# Patient Record
Sex: FEMALE | Race: WHITE | NOT HISPANIC OR LATINO | URBAN - METROPOLITAN AREA
[De-identification: names, ages, dates, MRNs, and addresses within clinical notes are randomized per-mention and may not be internally consistent; named-entity substitution may affect disease eponyms.]

---

## 2017-07-05 ENCOUNTER — EMERGENCY (EMERGENCY)
Facility: HOSPITAL | Age: 47
LOS: 1 days | Discharge: HOME | End: 2017-07-05
Admitting: EMERGENCY MEDICINE
Payer: COMMERCIAL

## 2017-07-05 PROCEDURE — 70450 CT HEAD/BRAIN W/O DYE: CPT | Mod: 26

## 2017-07-05 PROCEDURE — 99284 EMERGENCY DEPT VISIT MOD MDM: CPT | Mod: 25

## 2017-07-06 PROCEDURE — 96372 THER/PROPH/DIAG INJ SC/IM: CPT

## 2017-07-06 PROCEDURE — 70450 CT HEAD/BRAIN W/O DYE: CPT

## 2017-07-06 PROCEDURE — 99284 EMERGENCY DEPT VISIT MOD MDM: CPT | Mod: 25

## 2017-12-28 ENCOUNTER — OUTPATIENT (OUTPATIENT)
Dept: OUTPATIENT SERVICES | Facility: HOSPITAL | Age: 47
LOS: 1 days | End: 2017-12-28
Payer: COMMERCIAL

## 2017-12-28 VITALS
DIASTOLIC BLOOD PRESSURE: 74 MMHG | RESPIRATION RATE: 16 BRPM | WEIGHT: 242.07 LBS | HEART RATE: 95 BPM | SYSTOLIC BLOOD PRESSURE: 123 MMHG | TEMPERATURE: 99 F

## 2017-12-28 DIAGNOSIS — D25.9 LEIOMYOMA OF UTERUS, UNSPECIFIED: ICD-10-CM

## 2017-12-28 DIAGNOSIS — Z01.818 ENCOUNTER FOR OTHER PREPROCEDURAL EXAMINATION: ICD-10-CM

## 2017-12-28 DIAGNOSIS — Z98.890 OTHER SPECIFIED POSTPROCEDURAL STATES: Chronic | ICD-10-CM

## 2017-12-28 LAB
ALBUMIN SERPL ELPH-MCNC: 3.3 G/DL — SIGNIFICANT CHANGE UP (ref 3.3–5)
ALP SERPL-CCNC: 74 U/L — SIGNIFICANT CHANGE UP (ref 40–120)
ALT FLD-CCNC: 27 U/L — SIGNIFICANT CHANGE UP (ref 12–78)
ANION GAP SERPL CALC-SCNC: 10 MMOL/L — SIGNIFICANT CHANGE UP (ref 5–17)
AST SERPL-CCNC: 22 U/L — SIGNIFICANT CHANGE UP (ref 15–37)
BILIRUB SERPL-MCNC: 0.3 MG/DL — SIGNIFICANT CHANGE UP (ref 0.2–1.2)
BUN SERPL-MCNC: 8 MG/DL — SIGNIFICANT CHANGE UP (ref 7–23)
CALCIUM SERPL-MCNC: 8.3 MG/DL — LOW (ref 8.5–10.1)
CHLORIDE SERPL-SCNC: 104 MMOL/L — SIGNIFICANT CHANGE UP (ref 96–108)
CO2 SERPL-SCNC: 27 MMOL/L — SIGNIFICANT CHANGE UP (ref 22–31)
CREAT SERPL-MCNC: 0.75 MG/DL — SIGNIFICANT CHANGE UP (ref 0.5–1.3)
GLUCOSE SERPL-MCNC: 94 MG/DL — SIGNIFICANT CHANGE UP (ref 70–99)
HCG SERPL-ACNC: <1 MIU/ML — SIGNIFICANT CHANGE UP
HCT VFR BLD CALC: 31.5 % — LOW (ref 34.5–45)
HGB BLD-MCNC: 9.5 G/DL — LOW (ref 11.5–15.5)
MCHC RBC-ENTMCNC: 20.2 PG — LOW (ref 27–34)
MCHC RBC-ENTMCNC: 30.3 GM/DL — LOW (ref 32–36)
MCV RBC AUTO: 66.7 FL — LOW (ref 80–100)
PLATELET # BLD AUTO: 211 K/UL — SIGNIFICANT CHANGE UP (ref 150–400)
POTASSIUM SERPL-MCNC: 3.8 MMOL/L — SIGNIFICANT CHANGE UP (ref 3.5–5.3)
POTASSIUM SERPL-SCNC: 3.8 MMOL/L — SIGNIFICANT CHANGE UP (ref 3.5–5.3)
PROT SERPL-MCNC: 7.4 G/DL — SIGNIFICANT CHANGE UP (ref 6–8.3)
RBC # BLD: 4.73 M/UL — SIGNIFICANT CHANGE UP (ref 3.8–5.2)
RBC # FLD: 17.8 % — HIGH (ref 10.3–14.5)
SODIUM SERPL-SCNC: 141 MMOL/L — SIGNIFICANT CHANGE UP (ref 135–145)
WBC # BLD: 6.8 K/UL — SIGNIFICANT CHANGE UP (ref 3.8–10.5)
WBC # FLD AUTO: 6.8 K/UL — SIGNIFICANT CHANGE UP (ref 3.8–10.5)

## 2017-12-28 PROCEDURE — G0463: CPT

## 2017-12-28 PROCEDURE — 85027 COMPLETE CBC AUTOMATED: CPT

## 2017-12-28 PROCEDURE — 86900 BLOOD TYPING SEROLOGIC ABO: CPT

## 2017-12-28 PROCEDURE — 93010 ELECTROCARDIOGRAM REPORT: CPT | Mod: NC

## 2017-12-28 PROCEDURE — 86901 BLOOD TYPING SEROLOGIC RH(D): CPT

## 2017-12-28 PROCEDURE — 71020: CPT | Mod: 26

## 2017-12-28 PROCEDURE — 84702 CHORIONIC GONADOTROPIN TEST: CPT

## 2017-12-28 PROCEDURE — 71046 X-RAY EXAM CHEST 2 VIEWS: CPT

## 2017-12-28 PROCEDURE — 80053 COMPREHEN METABOLIC PANEL: CPT

## 2017-12-28 PROCEDURE — 93005 ELECTROCARDIOGRAM TRACING: CPT

## 2017-12-28 PROCEDURE — 86850 RBC ANTIBODY SCREEN: CPT

## 2017-12-28 NOTE — H&P PST ADULT - MUSCULOSKELETAL COMMENTS
Occasional lower back pain due to herniated discs x 4 in LS spine, Occasional left lower leg muscle cramps

## 2017-12-28 NOTE — H&P PST ADULT - PROBLEM SELECTOR PLAN 2
Medical clearance needed. CBC, Comprehensive panel, T&S, HCG, EKG and CXR ordered. Pre-op instructions and surgical scrubs given and pt verbalized understanding.

## 2017-12-28 NOTE — H&P PST ADULT - NEGATIVE RESPIRATORY AND THORAX SYMPTOMS
no cough/no dyspnea/no wheezing/no hemoptysis/no pleuritic chest pain no dyspnea/no pleuritic chest pain/no wheezing/no hemoptysis

## 2017-12-28 NOTE — H&P PST ADULT - GASTROINTESTINAL DETAILS
no bruit/no guarding/normal/no rigidity/no organomegaly/bowel sounds normal/no rebound tenderness/no masses palpable/nontender/no distention/soft

## 2017-12-28 NOTE — H&P PST ADULT - HISTORY OF PRESENT ILLNESS
46yo female with     Pt complaining of having multiple uterine fibroids for the last 5 years and pt reports to having menorrhagia. Pt states she had vaginal spotting for 2 months and spotting stopped 3-4 days ago. Pt denies current n/v/d and abdominal pain. Pt electing for abdominal hysterectomy with bilateral salpingectomy on 1/10/18. 46yo female with PMH of pulmonary sarcoidosis here for PST. Pt complaining of having multiple uterine fibroids for the last 5 years and pt reports to having menorrhagia. Pt states she had vaginal spotting for 2 months and spotting stopped 3-4 days ago. Pt denies current n/v/d and abdominal pain. Pt electing for abdominal hysterectomy with bilateral salpingectomy on 1/10/18.

## 2017-12-28 NOTE — H&P PST ADULT - NEGATIVE CARDIOVASCULAR SYMPTOMS
no paroxysmal nocturnal dyspnea/no claudication/no peripheral edema/no chest pain/no orthopnea/no palpitations/no dyspnea on exertion

## 2017-12-28 NOTE — H&P PST ADULT - NSANTHOSAYNRD_GEN_A_CORE
No. CATRACHITA screening performed.  STOP BANG Legend: 0-2 = LOW Risk; 3-4 = INTERMEDIATE Risk; 5-8 = HIGH Risk

## 2017-12-28 NOTE — H&P PST ADULT - RS GEN PE MLT RESP DETAILS PC
good air movement/airway patent/respirations non-labored/normal/breath sounds equal/clear to auscultation bilaterally

## 2017-12-28 NOTE — H&P PST ADULT - PMH
Anemia    Sarcoidosis of lung  (Dx 20 years ago, last seen by pulmonogist in 2011) Anemia    Leiomyoma of uterus, unspecified    Sarcoidosis of lung  (Dx 20 years ago, last seen by pulmonologist in 2011)

## 2017-12-28 NOTE — H&P PST ADULT - FAMILY HISTORY
Mother  Still living? No  Family history of breast cancer in mother, Age at diagnosis: Age Unknown     Father  Still living? No  CAD (coronary artery disease), Age at diagnosis: Age Unknown     Sibling  Still living? Yes, Estimated age: Age Unknown  Family history of leukemia, Age at diagnosis: Age Unknown

## 2018-01-09 RX ORDER — HYDROMORPHONE HYDROCHLORIDE 2 MG/ML
0.5 INJECTION INTRAMUSCULAR; INTRAVENOUS; SUBCUTANEOUS
Qty: 0 | Refills: 0 | Status: DISCONTINUED | OUTPATIENT
Start: 2018-01-10 | End: 2018-01-12

## 2018-01-09 RX ORDER — SODIUM CHLORIDE 9 MG/ML
1000 INJECTION, SOLUTION INTRAVENOUS
Qty: 0 | Refills: 0 | Status: DISCONTINUED | OUTPATIENT
Start: 2018-01-10 | End: 2018-01-10

## 2018-01-10 ENCOUNTER — INPATIENT (INPATIENT)
Facility: HOSPITAL | Age: 48
LOS: 1 days | Discharge: HOME | DRG: 743 | End: 2018-01-12
Attending: OBSTETRICS & GYNECOLOGY | Admitting: OBSTETRICS & GYNECOLOGY
Payer: COMMERCIAL

## 2018-01-10 ENCOUNTER — RESULT REVIEW (OUTPATIENT)
Age: 48
End: 2018-01-10

## 2018-01-10 VITALS
SYSTOLIC BLOOD PRESSURE: 133 MMHG | OXYGEN SATURATION: 99 % | TEMPERATURE: 98 F | WEIGHT: 242.07 LBS | HEART RATE: 77 BPM | RESPIRATION RATE: 22 BRPM | DIASTOLIC BLOOD PRESSURE: 78 MMHG

## 2018-01-10 DIAGNOSIS — Z98.890 OTHER SPECIFIED POSTPROCEDURAL STATES: Chronic | ICD-10-CM

## 2018-01-10 DIAGNOSIS — D25.9 LEIOMYOMA OF UTERUS, UNSPECIFIED: ICD-10-CM

## 2018-01-10 LAB — HCG UR QL: NEGATIVE — SIGNIFICANT CHANGE UP

## 2018-01-10 PROCEDURE — 88304 TISSUE EXAM BY PATHOLOGIST: CPT | Mod: 26

## 2018-01-10 PROCEDURE — 88307 TISSUE EXAM BY PATHOLOGIST: CPT | Mod: 26

## 2018-01-10 RX ORDER — SODIUM CHLORIDE 9 MG/ML
1000 INJECTION, SOLUTION INTRAVENOUS
Qty: 0 | Refills: 0 | Status: DISCONTINUED | OUTPATIENT
Start: 2018-01-10 | End: 2018-01-10

## 2018-01-10 RX ORDER — BUPIVACAINE HCL/PF 7.5 MG/ML
400 VIAL (ML) INJECTION
Qty: 0 | Refills: 0 | Status: DISCONTINUED | OUTPATIENT
Start: 2018-01-10 | End: 2018-01-12

## 2018-01-10 RX ORDER — ACETAMINOPHEN 500 MG
1000 TABLET ORAL ONCE
Qty: 0 | Refills: 0 | Status: COMPLETED | OUTPATIENT
Start: 2018-01-10 | End: 2018-01-10

## 2018-01-10 RX ORDER — INFLUENZA VIRUS VACCINE 15; 15; 15; 15 UG/.5ML; UG/.5ML; UG/.5ML; UG/.5ML
0.5 SUSPENSION INTRAMUSCULAR ONCE
Qty: 0 | Refills: 0 | Status: COMPLETED | OUTPATIENT
Start: 2018-01-10 | End: 2018-01-12

## 2018-01-10 RX ORDER — IBUPROFEN 200 MG
600 TABLET ORAL EVERY 8 HOURS
Qty: 0 | Refills: 0 | Status: DISCONTINUED | OUTPATIENT
Start: 2018-01-11 | End: 2018-01-12

## 2018-01-10 RX ORDER — KETOROLAC TROMETHAMINE 30 MG/ML
30 SYRINGE (ML) INJECTION EVERY 8 HOURS
Qty: 0 | Refills: 0 | Status: DISCONTINUED | OUTPATIENT
Start: 2018-01-10 | End: 2018-01-11

## 2018-01-10 RX ORDER — SODIUM CHLORIDE 9 MG/ML
1000 INJECTION, SOLUTION INTRAVENOUS
Qty: 0 | Refills: 0 | Status: DISCONTINUED | OUTPATIENT
Start: 2018-01-10 | End: 2018-01-11

## 2018-01-10 RX ORDER — DOCUSATE SODIUM 100 MG
100 CAPSULE ORAL THREE TIMES A DAY
Qty: 0 | Refills: 0 | Status: DISCONTINUED | OUTPATIENT
Start: 2018-01-10 | End: 2018-01-12

## 2018-01-10 RX ORDER — ONDANSETRON 8 MG/1
4 TABLET, FILM COATED ORAL ONCE
Qty: 0 | Refills: 0 | Status: DISCONTINUED | OUTPATIENT
Start: 2018-01-10 | End: 2018-01-12

## 2018-01-10 RX ORDER — BUPIVACAINE HCL/PF 7.5 MG/ML
400 VIAL (ML) INJECTION
Qty: 0 | Refills: 0 | Status: DISCONTINUED | OUTPATIENT
Start: 2018-01-10 | End: 2018-01-10

## 2018-01-10 RX ORDER — IBUPROFEN 200 MG
1 TABLET ORAL
Qty: 0 | Refills: 0 | COMMUNITY

## 2018-01-10 RX ORDER — ENOXAPARIN SODIUM 100 MG/ML
40 INJECTION SUBCUTANEOUS EVERY 24 HOURS
Qty: 0 | Refills: 0 | Status: DISCONTINUED | OUTPATIENT
Start: 2018-01-11 | End: 2018-01-11

## 2018-01-10 RX ORDER — MORPHINE SULFATE 50 MG/1
4 CAPSULE, EXTENDED RELEASE ORAL EVERY 4 HOURS
Qty: 0 | Refills: 0 | Status: DISCONTINUED | OUTPATIENT
Start: 2018-01-10 | End: 2018-01-11

## 2018-01-10 RX ORDER — OXYCODONE AND ACETAMINOPHEN 5; 325 MG/1; MG/1
2 TABLET ORAL EVERY 6 HOURS
Qty: 0 | Refills: 0 | Status: DISCONTINUED | OUTPATIENT
Start: 2018-01-10 | End: 2018-01-12

## 2018-01-10 RX ORDER — CEFAZOLIN SODIUM 1 G
2000 VIAL (EA) INJECTION ONCE
Qty: 0 | Refills: 0 | Status: COMPLETED | OUTPATIENT
Start: 2018-01-10 | End: 2018-01-10

## 2018-01-10 RX ORDER — FERROUS GLUCONATE 100 %
0 POWDER (GRAM) MISCELLANEOUS
Qty: 0 | Refills: 0 | COMMUNITY

## 2018-01-10 RX ADMIN — Medication 4 MILLILITER(S): at 12:21

## 2018-01-10 RX ADMIN — SODIUM CHLORIDE 125 MILLILITER(S): 9 INJECTION, SOLUTION INTRAVENOUS at 23:37

## 2018-01-10 RX ADMIN — SODIUM CHLORIDE 75 MILLILITER(S): 9 INJECTION, SOLUTION INTRAVENOUS at 08:47

## 2018-01-10 RX ADMIN — Medication 30 MILLIGRAM(S): at 22:10

## 2018-01-10 RX ADMIN — Medication 30 MILLIGRAM(S): at 16:23

## 2018-01-10 RX ADMIN — Medication 30 MILLIGRAM(S): at 15:33

## 2018-01-10 RX ADMIN — Medication 30 MILLIGRAM(S): at 21:51

## 2018-01-10 RX ADMIN — SODIUM CHLORIDE 125 MILLILITER(S): 9 INJECTION, SOLUTION INTRAVENOUS at 12:23

## 2018-01-10 NOTE — PATIENT PROFILE ADULT. - PMH
Anemia    Leiomyoma of uterus, unspecified    Sarcoidosis of lung  (Dx 20 years ago, last seen by pulmonologist in 2011)

## 2018-01-10 NOTE — BRIEF OPERATIVE NOTE - PROCEDURE
<<-----Click on this checkbox to enter Procedure Incision and drainage of endometrioma  01/10/2018  left ovary  Active  [id number]  Abdominal hysterotomy  01/10/2018    Active  [email or web address]  Salpingectomy, bilateral  01/10/2018    Active  [email or web address]

## 2018-01-10 NOTE — BRIEF OPERATIVE NOTE - POST-OP DX
Endometrioma of ovary  01/10/2018  left  Active  Fidelina Wilson  Intramural leiomyoma of uterus  01/10/2018    Fidelina Mahoney

## 2018-01-11 ENCOUNTER — TRANSCRIPTION ENCOUNTER (OUTPATIENT)
Age: 48
End: 2018-01-11

## 2018-01-11 LAB
ALBUMIN SERPL ELPH-MCNC: 2.6 G/DL — LOW (ref 3.3–5)
ALP SERPL-CCNC: 55 U/L — SIGNIFICANT CHANGE UP (ref 40–120)
ALT FLD-CCNC: 33 U/L — SIGNIFICANT CHANGE UP (ref 12–78)
ANION GAP SERPL CALC-SCNC: 7 MMOL/L — SIGNIFICANT CHANGE UP (ref 5–17)
AST SERPL-CCNC: 28 U/L — SIGNIFICANT CHANGE UP (ref 15–37)
BASOPHILS # BLD AUTO: 0 K/UL — SIGNIFICANT CHANGE UP (ref 0–0.2)
BASOPHILS NFR BLD AUTO: 0.4 % — SIGNIFICANT CHANGE UP (ref 0–2)
BILIRUB SERPL-MCNC: 0.3 MG/DL — SIGNIFICANT CHANGE UP (ref 0.2–1.2)
BUN SERPL-MCNC: 9 MG/DL — SIGNIFICANT CHANGE UP (ref 7–23)
CALCIUM SERPL-MCNC: 8 MG/DL — LOW (ref 8.5–10.1)
CHLORIDE SERPL-SCNC: 109 MMOL/L — HIGH (ref 96–108)
CO2 SERPL-SCNC: 27 MMOL/L — SIGNIFICANT CHANGE UP (ref 22–31)
CREAT SERPL-MCNC: 0.65 MG/DL — SIGNIFICANT CHANGE UP (ref 0.5–1.3)
EOSINOPHIL # BLD AUTO: 0 K/UL — SIGNIFICANT CHANGE UP (ref 0–0.5)
EOSINOPHIL NFR BLD AUTO: 0 % — SIGNIFICANT CHANGE UP (ref 0–6)
GLUCOSE SERPL-MCNC: 92 MG/DL — SIGNIFICANT CHANGE UP (ref 70–99)
HCT VFR BLD CALC: 26.4 % — LOW (ref 34.5–45)
HGB BLD-MCNC: 8.1 G/DL — LOW (ref 11.5–15.5)
HYPOCHROMIA BLD QL: SLIGHT — SIGNIFICANT CHANGE UP
LYMPHOCYTES # BLD AUTO: 1.9 K/UL — SIGNIFICANT CHANGE UP (ref 1–3.3)
LYMPHOCYTES # BLD AUTO: 17 % — SIGNIFICANT CHANGE UP (ref 13–44)
MCHC RBC-ENTMCNC: 20.5 PG — LOW (ref 27–34)
MCHC RBC-ENTMCNC: 30.6 GM/DL — LOW (ref 32–36)
MCV RBC AUTO: 67.1 FL — LOW (ref 80–100)
MONOCYTES # BLD AUTO: 1 K/UL — HIGH (ref 0–0.9)
MONOCYTES NFR BLD AUTO: 9.6 % — HIGH (ref 1–9)
NEUTROPHILS # BLD AUTO: 8 K/UL — HIGH (ref 1.8–7.4)
NEUTROPHILS NFR BLD AUTO: 73 % — SIGNIFICANT CHANGE UP (ref 43–77)
PLAT MORPH BLD: NORMAL — SIGNIFICANT CHANGE UP
PLATELET # BLD AUTO: 278 K/UL — SIGNIFICANT CHANGE UP (ref 150–400)
POTASSIUM SERPL-MCNC: 3.5 MMOL/L — SIGNIFICANT CHANGE UP (ref 3.5–5.3)
POTASSIUM SERPL-SCNC: 3.5 MMOL/L — SIGNIFICANT CHANGE UP (ref 3.5–5.3)
PROT SERPL-MCNC: 6.1 G/DL — SIGNIFICANT CHANGE UP (ref 6–8.3)
RBC # BLD: 3.94 M/UL — SIGNIFICANT CHANGE UP (ref 3.8–5.2)
RBC # FLD: 17.7 % — HIGH (ref 10.3–14.5)
RBC BLD AUTO: SIGNIFICANT CHANGE UP
SODIUM SERPL-SCNC: 143 MMOL/L — SIGNIFICANT CHANGE UP (ref 135–145)
SURGICAL PATHOLOGY FINAL REPORT - CH: SIGNIFICANT CHANGE UP
WBC # BLD: 10.9 K/UL — HIGH (ref 3.8–10.5)
WBC # FLD AUTO: 10.9 K/UL — HIGH (ref 3.8–10.5)

## 2018-01-11 RX ADMIN — SODIUM CHLORIDE 125 MILLILITER(S): 9 INJECTION, SOLUTION INTRAVENOUS at 06:37

## 2018-01-11 RX ADMIN — Medication 600 MILLIGRAM(S): at 07:06

## 2018-01-11 RX ADMIN — OXYCODONE AND ACETAMINOPHEN 2 TABLET(S): 5; 325 TABLET ORAL at 19:08

## 2018-01-11 RX ADMIN — Medication 600 MILLIGRAM(S): at 15:30

## 2018-01-11 RX ADMIN — Medication 600 MILLIGRAM(S): at 22:30

## 2018-01-11 RX ADMIN — Medication 600 MILLIGRAM(S): at 14:50

## 2018-01-11 RX ADMIN — Medication 600 MILLIGRAM(S): at 21:39

## 2018-01-11 RX ADMIN — OXYCODONE AND ACETAMINOPHEN 2 TABLET(S): 5; 325 TABLET ORAL at 10:15

## 2018-01-11 RX ADMIN — OXYCODONE AND ACETAMINOPHEN 2 TABLET(S): 5; 325 TABLET ORAL at 09:27

## 2018-01-11 RX ADMIN — Medication 600 MILLIGRAM(S): at 06:19

## 2018-01-11 RX ADMIN — OXYCODONE AND ACETAMINOPHEN 2 TABLET(S): 5; 325 TABLET ORAL at 18:15

## 2018-01-11 NOTE — PROGRESS NOTE ADULT - SUBJECTIVE AND OBJECTIVE BOX
POD #1 s/p subtotal hysterectomy, bilateral salpingectomy    S: Patient feels well. Pain well controlled. Sat in chair but hasnt really ambulated. Tolerating clears. No flatus yet but feels gurgling. Denies fevers, chills, nausea, vomiting, dizziness.    O:    Vital Signs Last 24 Hrs  T(C): 37.4 (11 Jan 2018 07:34), Max: 37.4 (11 Jan 2018 07:34)  T(F): 99.3 (11 Jan 2018 07:34), Max: 99.3 (11 Jan 2018 07:34)  HR: 71 (11 Jan 2018 07:34) (60 - 78)  BP: 121/78 (11 Jan 2018 07:34) (104/53 - 121/78)  BP(mean): --  RR: 16 (11 Jan 2018 07:34) (13 - 22)  SpO2: 96% (11 Jan 2018 07:34) (92% - 99%)  Gen: AAO x 3  Abd: Soft, mildly distended. Incision dressed, clean and dry. no induration, no discharge, no erythema.     Labs:                          8.1    10.9  )-----------( 278      ( 11 Jan 2018 06:50 )             26.4     01-11    143  |  109<H>  |  9   ----------------------------<  92  3.5   |  27  |  0.65    Ca    8.0<L>      11 Jan 2018 06:50    TPro  6.1  /  Alb  2.6<L>  /  TBili  0.3  /  DBili  x   /  AST  28  /  ALT  33  /  AlkPhos  55  01-11    I&O's Summary    10 Jose 2018 07:01  -  11 Jan 2018 07:00  --------------------------------------------------------  IN: 2430 mL / OUT: 1950 mL / NET: 480 mL        MEDICATIONS  (STANDING):  BUpivacaine 0.375% On-Q Pump 400 milliLiter(s) (4 mL/Hr) IntraDermal. <Continuous>  enoxaparin Injectable 40 milliGRAM(s) SubCutaneous every 24 hours  ibuprofen  Tablet 600 milliGRAM(s) Oral every 8 hours  influenza   Vaccine 0.5 milliLiter(s) IntraMuscular once  lactated ringers. 1000 milliLiter(s) (125 mL/Hr) IV Continuous <Continuous>    MEDICATIONS  (PRN):  docusate sodium 100 milliGRAM(s) Oral three times a day PRN Stool Softening  HYDROmorphone  Injectable 0.5 milliGRAM(s) IV Push every 10 minutes PRN Moderate Pain (4 - 6)  morphine  - Injectable 4 milliGRAM(s) IV Push every 4 hours PRN Severe Pain  ondansetron Injectable 4 milliGRAM(s) IV Push once PRN Postoperative Nausea and/or Vomiting  oxyCODONE    5 mG/acetaminophen 325 mG 2 Tablet(s) Oral every 6 hours PRN Severe Pain      A/P: 47y Female POD #1 s/p hysterectomy  Advance diet  Encouraged ambulation  Hold lovenox in light of significant anemia (preop hct 31%)  Findings/procedure explained  PO analgesia

## 2018-01-11 NOTE — PROGRESS NOTE ADULT - SUBJECTIVE AND OBJECTIVE BOX
The patient was interviewed and evaluated  47y Female    Vital Signs Last 24 Hrs  T(C): 37.4 (11 Jan 2018 07:34), Max: 37.4 (11 Jan 2018 07:34)  T(F): 99.3 (11 Jan 2018 07:34), Max: 99.3 (11 Jan 2018 07:34)  HR: 71 (11 Jan 2018 07:34) (60 - 78)  BP: 121/78 (11 Jan 2018 07:34) (104/53 - 133/78)  BP(mean): --  RR: 16 (11 Jan 2018 07:34) (13 - 22)  SpO2: 96% (11 Jan 2018 07:34) (92% - 99%)    Pt seen, doing well, no anesthesia complications or complaints noted or reported.   No Nausea    No additional recommendations.     Pain well controlled

## 2018-01-12 ENCOUNTER — TRANSCRIPTION ENCOUNTER (OUTPATIENT)
Age: 48
End: 2018-01-12

## 2018-01-12 VITALS
TEMPERATURE: 99 F | SYSTOLIC BLOOD PRESSURE: 119 MMHG | DIASTOLIC BLOOD PRESSURE: 71 MMHG | HEART RATE: 72 BPM | OXYGEN SATURATION: 96 % | RESPIRATION RATE: 16 BRPM

## 2018-01-12 PROCEDURE — 90686 IIV4 VACC NO PRSV 0.5 ML IM: CPT

## 2018-01-12 PROCEDURE — 88304 TISSUE EXAM BY PATHOLOGIST: CPT

## 2018-01-12 PROCEDURE — 80053 COMPREHEN METABOLIC PANEL: CPT

## 2018-01-12 PROCEDURE — 88307 TISSUE EXAM BY PATHOLOGIST: CPT

## 2018-01-12 PROCEDURE — 82962 GLUCOSE BLOOD TEST: CPT

## 2018-01-12 PROCEDURE — C1889: CPT

## 2018-01-12 PROCEDURE — 85027 COMPLETE CBC AUTOMATED: CPT

## 2018-01-12 PROCEDURE — 81025 URINE PREGNANCY TEST: CPT

## 2018-01-12 RX ORDER — BUPIVACAINE HCL/PF 7.5 MG/ML
0 VIAL (ML) INJECTION
Qty: 0 | Refills: 0 | COMMUNITY
Start: 2018-01-12

## 2018-01-12 RX ADMIN — INFLUENZA VIRUS VACCINE 0.5 MILLILITER(S): 15; 15; 15; 15 SUSPENSION INTRAMUSCULAR at 11:58

## 2018-01-12 RX ADMIN — OXYCODONE AND ACETAMINOPHEN 2 TABLET(S): 5; 325 TABLET ORAL at 08:10

## 2018-01-12 RX ADMIN — Medication 600 MILLIGRAM(S): at 05:39

## 2018-01-12 RX ADMIN — Medication 600 MILLIGRAM(S): at 07:09

## 2018-01-12 RX ADMIN — OXYCODONE AND ACETAMINOPHEN 2 TABLET(S): 5; 325 TABLET ORAL at 09:21

## 2018-01-12 NOTE — DISCHARGE NOTE ADULT - CARE PLAN
Principal Discharge DX:	Intramural leiomyoma of uterus  Goal:	s/p subtotal hysterectomy  Instructions for follow-up, activity and diet:	regular diet. ambulate as tolerated.  Secondary Diagnosis:	Anemia

## 2018-01-12 NOTE — DISCHARGE NOTE ADULT - MEDICATION SUMMARY - MEDICATIONS TO TAKE
I will START or STAY ON the medications listed below when I get home from the hospital:    oxyCODONE-acetaminophen 5 mg-325 mg oral tablet  -- 2 tab(s) by mouth every 6 hours, As needed, Severe Pain  -- Indication: For pain med    Advil 200 mg oral tablet  -- 1 tab(s) by mouth every 6 hours, As Needed  -- Indication: For pain med    Ferralet 320 mg oral tablet  -- orally once a day  -- Indication: For Home med    bupivacaine  -- Indication: For pain pump

## 2018-01-12 NOTE — DISCHARGE NOTE ADULT - NS AS ACTIVITY OBS
Do not make important decisions/Walking-Indoors allowed/No Heavy lifting/straining/Walking-Outdoors allowed/Stairs allowed/Do not drive or operate machinery/Showering allowed

## 2018-01-12 NOTE — DISCHARGE NOTE ADULT - HOSPITAL COURSE
48 yo female s/p subtotal hysterectomy bilateral salpingectomy. patient tolerated the procedure well with no intra/ post op complication tolerating full diet and ambulating independently. will discharge and f/u in [place] office on Tuesday.

## 2018-01-12 NOTE — PROGRESS NOTE ADULT - SUBJECTIVE AND OBJECTIVE BOX
POD #2. Passing flatus. Ambulating. Pain well controlled. Tolerating reg diet. Denies fevers, chills, nausea, vomiting, dizziness.    O:    Vital Signs Last 24 Hrs  T(C): 37.2 (12 Jan 2018 07:35), Max: 37.2 (12 Jan 2018 07:35)  T(F): 99 (12 Jan 2018 07:35), Max: 99 (12 Jan 2018 07:35)  HR: 72 (12 Jan 2018 07:35) (67 - 72)  BP: 119/71 (12 Jan 2018 07:35) (96/67 - 119/71)  BP(mean): --  RR: 16 (12 Jan 2018 07:35) (16 - 16)  SpO2: 96% (12 Jan 2018 07:35) (95% - 98%)  Gen: AAO x 3  Abd: Soft, mildly distended.   Dressing removed, incision clean and dry, OnQ intact. no induration, no discharge, no erythema.     Labs:                          8.1    10.9  )-----------( 278      ( 11 Jan 2018 06:50 )             26.4     01-11    143  |  109<H>  |  9   ----------------------------<  92  3.5   |  27  |  0.65    Ca    8.0<L>      11 Jan 2018 06:50    TPro  6.1  /  Alb  2.6<L>  /  TBili  0.3  /  DBili  x   /  AST  28  /  ALT  33  /  AlkPhos  55  01-11    I&O's Summary    11 Jan 2018 07:01  -  12 Jan 2018 07:00  --------------------------------------------------------  IN: 760 mL / OUT: 400 mL / NET: 360 mL        MEDICATIONS  (STANDING):  BUpivacaine 0.375% On-Q Pump 400 milliLiter(s) (4 mL/Hr) IntraDermal. <Continuous>  ibuprofen  Tablet 600 milliGRAM(s) Oral every 8 hours  influenza   Vaccine 0.5 milliLiter(s) IntraMuscular once    MEDICATIONS  (PRN):  docusate sodium 100 milliGRAM(s) Oral three times a day PRN Stool Softening  HYDROmorphone  Injectable 0.5 milliGRAM(s) IV Push every 10 minutes PRN Moderate Pain (4 - 6)  ondansetron Injectable 4 milliGRAM(s) IV Push once PRN Postoperative Nausea and/or Vomiting  oxyCODONE    5 mG/acetaminophen 325 mG 2 Tablet(s) Oral every 6 hours PRN Severe Pain      A/P: 47y Female POD #2 s/p hysterectomy  Doing well, home today  Warning signs and discharge instructions reviewed  RTO tuesday for pain pump   Ibuprofen/percocet (has Rx)

## 2018-01-12 NOTE — DISCHARGE NOTE ADULT - ADDITIONAL INSTRUCTIONS
No intercourse/douching/tampons for 6 weeks. Avoid strenuous activity, exercise, heavy lifting for two weeks. You may shower freely, but do not soak in the tub or swim. Eat according to your appetite. Please follow all written and verbal instructions, and call us if you are having any acute problems, including (but not limited to) excessive vaginal bleeding (soaking pads), fever >100 degrees, persistent nausea or vomiting,  No Heavy lifting/straining, Do not drive or operate machinery, Showering allowed, Walking-Indoors allowed, Walking-Outdoors allowed, Stairs allowed  please f/u in [place] office on Tuesday

## 2018-01-12 NOTE — DISCHARGE NOTE ADULT - CARE PROVIDER_API CALL
Donnie Dennis), Obstetrics and Gynecology  [address] [place] [address]  Phone: [phone]  Fax: [phone]

## 2018-01-12 NOTE — DISCHARGE NOTE ADULT - PATIENT PORTAL LINK FT
“You can access the Follow[facility] Patient Portal, offered by [facility], by registering with the following website: [email or web address]”

## 2018-03-05 ENCOUNTER — APPOINTMENT (OUTPATIENT)
Dept: MAMMOGRAPHY | Facility: HOSPITAL | Age: 48
End: 2018-03-05
Payer: COMMERCIAL

## 2018-03-05 ENCOUNTER — APPOINTMENT (OUTPATIENT)
Dept: ULTRASOUND IMAGING | Facility: HOSPITAL | Age: 48
End: 2018-03-05
Payer: COMMERCIAL

## 2018-03-05 ENCOUNTER — OUTPATIENT (OUTPATIENT)
Dept: OUTPATIENT SERVICES | Facility: HOSPITAL | Age: 48
LOS: 1 days | End: 2018-03-05
Payer: COMMERCIAL

## 2018-03-05 DIAGNOSIS — Z98.890 OTHER SPECIFIED POSTPROCEDURAL STATES: Chronic | ICD-10-CM

## 2018-03-05 DIAGNOSIS — Z12.31 ENCOUNTER FOR SCREENING MAMMOGRAM FOR MALIGNANT NEOPLASM OF BREAST: ICD-10-CM

## 2018-03-05 PROCEDURE — 76641 ULTRASOUND BREAST COMPLETE: CPT

## 2018-03-05 PROCEDURE — 77063 BREAST TOMOSYNTHESIS BI: CPT | Mod: 26

## 2018-03-05 PROCEDURE — 77067 SCR MAMMO BI INCL CAD: CPT | Mod: 26

## 2018-03-05 PROCEDURE — 76641 ULTRASOUND BREAST COMPLETE: CPT | Mod: 26

## 2018-03-05 PROCEDURE — 77067 SCR MAMMO BI INCL CAD: CPT

## 2018-03-05 PROCEDURE — 77063 BREAST TOMOSYNTHESIS BI: CPT

## 2018-07-16 PROBLEM — D86.0 SARCOIDOSIS OF LUNG: Chronic | Status: ACTIVE | Noted: 2017-12-28
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
## 2023-08-08 NOTE — ASU PREOP CHECKLIST - ORDERS/MEDICATION ADMINISTRATION RECORD ON CHART
2nd attempt to contact the patient to schedule an endoscopy procedure(s) EGD and colonoscopy. The patient did not answer the call and left a voice message requesting a call back.     done

## 2024-01-05 NOTE — H&P PST ADULT - SURGICAL SITE INCISION
Postpartum Discharge Instructions      DIET  Eat a well balanced diet focusing on foods high in fiber and protein.     Drink plenty of fluids especially water.    To avoid constipation you may take a mild stool softener as recommended by your doctor .    ACTIVITY  Gradually increase your activity. Resume exercise regimen only after advise by your doctor.    Avoid lifting anything heavier than your baby or a gallon of milk for 2 weeks.     Avoid driving until your doctor or midwife has given their approval.    Rise slowly from a lying to sitting and then a standing position.    Climb stairs one at a time. Use caution when carrying your baby up and down the stairs.    NO SEXUAL Activity for 4-6 weeks or until advised by your doctor; Nothing in vagina: intercourse, tampons, or douching.     Be prepared to discuss family planning at your follow-up OB visit.     You may feel tired or have a lack of energy. You may continue your prenatal vitamin to replenish nutrients post delivery. Nap when baby naps to catch up on sleep.     EMOTIONS  You may feed traylor, sad, teary, & overwhelmed. Contact your OB provider if you feel you may be showing signs of postpartum depression, or have thoughts of harming yourself or your infant.    If infant will not stop crying, contact another adult for help or place infant in their crib on their back and take a break. NEVER shake your infant.     BLEEDING  Vaginal bleeding will decrease in amount over the next few weeks.    You will notice that as your activity increases, your flow may increase. This is your body's way of telling you, you need take things easier and rest more often.  Call your OB/ER if you are saturating more than one maxi pad in an hour & resting does help.    BREAST CARE  Take medications as recommended by your doctor or midwife for pain    If you develop a warm, red, tender area on your breast or develop a fever contact your OB provider.     For breastfeeding moms:  If  bleeding has increased and you are saturating a pad in an hour.    Your abdomen is tender to touch.    You are passing blood clots bigger than the size of a lemon.    If you are experiencing extreme weakness or dizziness.     If you are having flu-like symptoms such as achy muscles or joints.    There is a foul smell or a green color to your vaginal bleeding.    If you have pain that cannot be relieved with Tylenol or Motrin.    You have persistent burning with urination or frequency.     Call if you have concerns about your well-being.    You are unable to sleep, eat, or are having thoughts of harming yourself or your baby. Call you OB provider if your depression score is greater than 10.    You have swelling, bleeding, drainage, foul odor, redness, or warmth in/around your incision or stitches.    You have a red, warm, tender area in you calf. Please contact your OB physician right way.         Please refer to your \"Guide for New Mothers \" Binder for  further information of caring for yourself & your baby.      Follow-up with your OB doctor as specified.    For Breastfeeding moms, you can contact our lactation specialists with  any problems or questions you may have.  Contact our Lactation Consultants at [phone]. Please feel free to leave a message and they will return your call.       no

## 2024-04-19 PROBLEM — D25.9 LEIOMYOMA OF UTERUS, UNSPECIFIED: Chronic | Status: ACTIVE | Noted: 2017-12-28

## 2024-04-19 PROBLEM — D64.9 ANEMIA, UNSPECIFIED: Chronic | Status: ACTIVE | Noted: 2017-12-28

## 2024-05-01 PROBLEM — R19.00 PELVIC MASS: Status: ACTIVE | Noted: 2024-05-01

## 2024-05-01 PROBLEM — R97.1 ELEVATED CA-125: Status: ACTIVE | Noted: 2024-05-01

## 2024-05-07 NOTE — PATIENT PROFILE ADULT. - SOURCE OF INFORMATION, PROFILE
Comment: Biopsy proven AK right superior shoulder 04/16/24 Render Risk Assessment In Note?: no Detail Level: Generalized patient

## 2024-05-08 ENCOUNTER — APPOINTMENT (OUTPATIENT)
Dept: GYNECOLOGIC ONCOLOGY | Facility: CLINIC | Age: 54
End: 2024-05-08
Payer: COMMERCIAL

## 2024-05-08 VITALS
HEART RATE: 76 BPM | DIASTOLIC BLOOD PRESSURE: 82 MMHG | WEIGHT: 248.38 LBS | BODY MASS INDEX: 39.92 KG/M2 | SYSTOLIC BLOOD PRESSURE: 120 MMHG | HEIGHT: 66 IN

## 2024-05-08 DIAGNOSIS — Z80.3 FAMILY HISTORY OF MALIGNANT NEOPLASM OF BREAST: ICD-10-CM

## 2024-05-08 DIAGNOSIS — R19.00 INTRA-ABDOMINAL AND PELVIC SWELLING, MASS AND LUMP, UNSPECIFIED SITE: ICD-10-CM

## 2024-05-08 DIAGNOSIS — Z80.1 FAMILY HISTORY OF MALIGNANT NEOPLASM OF TRACHEA, BRONCHUS AND LUNG: ICD-10-CM

## 2024-05-08 DIAGNOSIS — R97.1 ELEVATED CANCER ANTIGEN 125 [CA 125]: ICD-10-CM

## 2024-05-08 DIAGNOSIS — D86.0 SARCOIDOSIS OF LUNG: ICD-10-CM

## 2024-05-08 PROCEDURE — 99204 OFFICE O/P NEW MOD 45 MIN: CPT

## 2024-05-08 PROCEDURE — 99459 PELVIC EXAMINATION: CPT

## 2024-05-08 NOTE — PHYSICAL EXAM
[Chaperone Present] : A chaperone was present in the examining room during all aspects of the physical examination [Normal] : Recto-Vaginal Exam: Normal [Fully active, able to carry on all pre-disease performance without restriction] : Status 0 - Fully active, able to carry on all pre-disease performance without restriction [[id number]] : A chaperone was present during the pelvic exam. [FreeTextEntry2] : Dimple [Absent] : Uterus: Absent [de-identified] : Pfan scar

## 2024-05-08 NOTE — OB HISTORY
[Total Preg ___] : : [unfilled] [Abortions ___] : [unfilled] (abortions) [unknown] : the patient is unsure of the date of her LMP

## 2024-05-08 NOTE — DISCUSSION/SUMMARY
[Reviewed Clinical Lab Test(s)] : Results of clinical tests were reviewed. [Reviewed Radiology Report(s)] : Radiology reports were reviewed. [Reviewed Radiology Film/Image(s)] : Images from radiology studies were reviewed and examined. [Discuss Alternatives/Risks/Benefits w/Patient] : All alternatives, risks, and benefits were discussed with the patient/family and all questions were answered.  Patient expressed good understanding and appreciates the importance of follow up as recommended. [FreeTextEntry1] : I sat down with Eugenia and reviewed the differential diagnosis of an adnexal mass in detail including benign, borderline and malignant ovarian neoplasms. Review of radiologic imaging suggests a benign process, likely an endometriotic cyst. Definitive diagnosis can only be obtained through surgical excision. Laparoscopy and excision of the adnexa with frozen section diagnosis may be recommended for treatment of an adnexal mass.  Surgical staging is performed if an ovarian malignancy is diagnosed. I described the staging of ovarian cancer and the importance of surgical staging results for determining prognosis and treatment. She understands that comprehensive staging for ovarian cancer typically includes bilateral salpingoopherectomy, retroperitoneal lymph node dissection, omentectomy and peritoneal biopsies and may require conversion to laparotomy. The risks and benefits of surgery were discussed in detail, including but not limited to risks of bleeding, infection, poor wound healing, venous thromboembolism and intraoperative injury. We discussed expectations for postoperative recuperation after laparoscopy and laparotomy. Alternatives to surgery, namely expectant management, were reviewed.   We discussed use of  in evaluation of an adnexal mass. We discussed endometriosis and other benign gynecologic pathology as a potential reason for a mild elevation in . Eugenia expressed understanding of the information discussed today. She will take time to consider her options. If she elects to proceed with surgery she will let me know and I would favor a robotic laparoscopic approach. If she elects observation she will return to Dr. Dennis for continued US monitoring.

## 2024-05-08 NOTE — HISTORY OF PRESENT ILLNESS
[FreeTextEntry1] : Referring GYN - Dr. Donnie Dennis PCP - Dr. Rivera  Ms. Holliday is a 52 yo nulliparous postmenopausal female who presents for initial consultation at the request of Dr. Dennis for the management of a pelvic mass. She has a known history of a left ovarian cyst since 5/2023 at which time it measured 2.0 cm. Recent TVUS on 3/21/24 revealed possible endometrioma and additional simple cyst. CA-125 was elevated. She was referred here for further management.   LABS on 3/21/24: CA-125 was 56.3 (ref<38.2)  was 33.8 (ref<15) CEA was <0.6  PATHOLOGY: [facility], BS, MICHAEL, 1/11/18, Dr. Dennis   a) endometrial polyp, secretory endometrium Right fallopian tube, bilateral salpingectomy no evidence of endometriosis   IMAGING: In office TVUS on 3/21/24: s/p hysterectomy. Normal cervical stump RTO normal LTO - a hypoechoic structure 3.15 x 2.56 x 3.93 cm suggestive of an endometrioma. There is another anechoic structure 3.79 x 2.65 x 4.17 cm appearance suggestive of simple cyst.  No FF.  PMHx: Pulmonary Sarcoidosis, preDM PSHx: [facility] Fam Hx: mother (breast cancer age 52)   HCM: PAP on 5/1/23: NEGATIVE Mammogram: Colonoscopy: COVID-19 vaccine series completed

## 2025-01-13 ENCOUNTER — NON-APPOINTMENT (OUTPATIENT)
Age: 55
End: 2025-01-13

## 2025-01-13 ENCOUNTER — APPOINTMENT (OUTPATIENT)
Dept: ORTHOPEDIC SURGERY | Facility: CLINIC | Age: 55
End: 2025-01-13
Payer: COMMERCIAL

## 2025-01-13 VITALS
SYSTOLIC BLOOD PRESSURE: 125 MMHG | HEIGHT: 65 IN | HEART RATE: 69 BPM | WEIGHT: 258 LBS | DIASTOLIC BLOOD PRESSURE: 74 MMHG | BODY MASS INDEX: 42.99 KG/M2

## 2025-01-13 DIAGNOSIS — M25.569 PAIN IN UNSPECIFIED KNEE: ICD-10-CM

## 2025-01-13 DIAGNOSIS — M23.92 UNSPECIFIED INTERNAL DERANGEMENT OF LEFT KNEE: ICD-10-CM

## 2025-01-13 PROCEDURE — 73564 X-RAY EXAM KNEE 4 OR MORE: CPT | Mod: LT

## 2025-01-13 PROCEDURE — 99203 OFFICE O/P NEW LOW 30 MIN: CPT

## 2025-02-03 ENCOUNTER — APPOINTMENT (OUTPATIENT)
Dept: MRI IMAGING | Facility: CLINIC | Age: 55
End: 2025-02-03
Payer: COMMERCIAL

## 2025-02-03 ENCOUNTER — OUTPATIENT (OUTPATIENT)
Dept: OUTPATIENT SERVICES | Facility: HOSPITAL | Age: 55
LOS: 1 days | End: 2025-02-03
Payer: COMMERCIAL

## 2025-02-03 DIAGNOSIS — M23.92 UNSPECIFIED INTERNAL DERANGEMENT OF LEFT KNEE: ICD-10-CM

## 2025-02-03 DIAGNOSIS — Z98.890 OTHER SPECIFIED POSTPROCEDURAL STATES: Chronic | ICD-10-CM

## 2025-02-03 PROCEDURE — 73721 MRI JNT OF LWR EXTRE W/O DYE: CPT | Mod: 26,LT

## 2025-02-03 PROCEDURE — 73721 MRI JNT OF LWR EXTRE W/O DYE: CPT

## 2025-02-05 ENCOUNTER — APPOINTMENT (OUTPATIENT)
Dept: ORTHOPEDIC SURGERY | Facility: CLINIC | Age: 55
End: 2025-02-05
Payer: COMMERCIAL

## 2025-02-05 DIAGNOSIS — S83.249A OTHER TEAR OF MEDIAL MENISCUS, CURRENT INJURY, UNSPECIFIED KNEE, INITIAL ENCOUNTER: ICD-10-CM

## 2025-02-05 DIAGNOSIS — S83.289A OTHER TEAR OF LATERAL MENISCUS, CURRENT INJURY, UNSPECIFIED KNEE, INITIAL ENCOUNTER: ICD-10-CM

## 2025-02-05 PROCEDURE — 99214 OFFICE O/P EST MOD 30 MIN: CPT

## 2025-02-05 RX ORDER — MELOXICAM 15 MG/1
15 TABLET ORAL DAILY
Qty: 30 | Refills: 0 | Status: ACTIVE | COMMUNITY
Start: 2025-02-05 | End: 1900-01-01

## 2025-04-21 ENCOUNTER — NON-APPOINTMENT (OUTPATIENT)
Age: 55
End: 2025-04-21